# Patient Record
Sex: FEMALE | Race: WHITE | NOT HISPANIC OR LATINO | Employment: UNEMPLOYED | URBAN - METROPOLITAN AREA
[De-identification: names, ages, dates, MRNs, and addresses within clinical notes are randomized per-mention and may not be internally consistent; named-entity substitution may affect disease eponyms.]

---

## 2018-09-11 ENCOUNTER — OFFICE VISIT (OUTPATIENT)
Dept: NEUROSURGERY | Facility: CLINIC | Age: 28
End: 2018-09-11

## 2018-09-11 VITALS
HEIGHT: 69 IN | DIASTOLIC BLOOD PRESSURE: 68 MMHG | SYSTOLIC BLOOD PRESSURE: 102 MMHG | WEIGHT: 228.8 LBS | BODY MASS INDEX: 33.89 KG/M2 | TEMPERATURE: 97.8 F

## 2018-09-11 DIAGNOSIS — F32.A DEPRESSION, UNSPECIFIED DEPRESSION TYPE: Primary | ICD-10-CM

## 2018-09-11 DIAGNOSIS — M54.2 NECK PAIN: ICD-10-CM

## 2018-09-11 DIAGNOSIS — G89.29 CHRONIC BILATERAL THORACIC BACK PAIN: ICD-10-CM

## 2018-09-11 DIAGNOSIS — M51.34 DEGENERATIVE DISC DISEASE, THORACIC: ICD-10-CM

## 2018-09-11 DIAGNOSIS — M54.6 CHRONIC BILATERAL THORACIC BACK PAIN: ICD-10-CM

## 2018-09-11 PROCEDURE — 99203 OFFICE O/P NEW LOW 30 MIN: CPT | Performed by: NEUROLOGICAL SURGERY

## 2018-09-11 RX ORDER — HYDROXYZINE 50 MG/1
50 TABLET, FILM COATED ORAL 3 TIMES DAILY PRN
COMMUNITY

## 2018-09-11 RX ORDER — INSULIN GLARGINE 100 [IU]/ML
INJECTION, SOLUTION SUBCUTANEOUS DAILY
COMMUNITY

## 2018-09-11 RX ORDER — METHOCARBAMOL 750 MG/1
750 TABLET, FILM COATED ORAL NIGHTLY
Qty: 30 TABLET | Refills: 0 | Status: SHIPPED | OUTPATIENT
Start: 2018-09-11

## 2018-09-11 RX ORDER — NABUMETONE 750 MG/1
750 TABLET, FILM COATED ORAL 2 TIMES DAILY
Qty: 60 TABLET | Refills: 0 | Status: SHIPPED | OUTPATIENT
Start: 2018-09-11

## 2018-09-11 RX ORDER — DULOXETIN HYDROCHLORIDE 30 MG/1
30 CAPSULE, DELAYED RELEASE ORAL DAILY
COMMUNITY

## 2018-09-11 NOTE — PROGRESS NOTES
Josyln Zheng  1990  [id number]      Chief Complaint   Patient presents with   • Neck Pain   • Arm Pain     BUE       HISTORY OF PRESENT ILLNESS:  This is a 28-year-old female who presents with a chief complaint of pain in her cervical, thoracic and lumbar area; numbness in her upper and lower extremities; and lifting bending and twisting.  Her history dates back 15 years.  Both of her parents apparently are disabled and she was called upon to do all the household chores.  Through them had degenerative disc disease; her father had multiple surgeries on his spine.  They have relocated multiple times although have lived in [facility] for the past 10 years.  She has been diagnosed as having diabetes with an A1c of 10-11; glucose concentration greater than 300.  She is had cervical and thoracic MRI and is referred for neurosurgical consultation.  EMG and NCV of been repeated on 2 occasions.  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     Past Medical History:   Diagnosis Date   • Arthritis    • Diabetes mellitus (CMS/HCC)    • Headache    • Low back pain    • Pneumonia        Past Surgical History:   Procedure Laterality Date   • APPENDECTOMY     •  SECTION     • CHOLECYSTECTOMY         Family History   Problem Relation Age of Onset   • Fibromyalgia Mother    • Arthritis Father    • Other Father         chronic back problems       Social History     Social History   • Marital status:      Spouse name: N/A   • Number of children: N/A   • Years of education: N/A     Occupational History   • Not on file.     Social History Main Topics   • Smoking status: Current Every Day Smoker     Packs/day: 1.00   • Smokeless tobacco: Not on file   • Alcohol use Yes   • Drug use: No   • Sexual activity: Defer     Other Topics Concern   • Not on file     Social History Narrative   • No narrative on file       No  Known Allergies      Current Outpatient Prescriptions:   •  Dulaglutide (TRULICITY) 1.5 MG/0.5ML solution pen-injector, Inject  under the skin into the appropriate area as directed., Disp: , Rfl:   •  DULoxetine (CYMBALTA) 30 MG capsule, Take 30 mg by mouth Daily., Disp: , Rfl:   •  Empagliflozin (JARDIANCE) 25 MG tablet, Take  by mouth., Disp: , Rfl:   •  hydrOXYzine (ATARAX) 50 MG tablet, Take 50 mg by mouth 3 (Three) Times a Day As Needed for Itching., Disp: , Rfl:   •  insulin glargine (LANTUS) 100 UNIT/ML injection, Inject  under the skin into the appropriate area as directed Daily., Disp: , Rfl:   •  insulin lispro (humaLOG) 100 UNIT/ML injection, Inject  under the skin into the appropriate area as directed 3 (Three) Times a Day Before Meals., Disp: , Rfl:     Review of Systems   Constitutional: Positive for activity change, chills, diaphoresis and fatigue. Negative for appetite change, fever and unexpected weight change.   HENT: Positive for congestion, dental problem, rhinorrhea and sinus pain. Negative for drooling, ear discharge, ear pain, facial swelling, hearing loss, mouth sores, nosebleeds, postnasal drip, sinus pressure, sneezing, sore throat, tinnitus, trouble swallowing and voice change.    Eyes: Positive for photophobia. Negative for pain, discharge, redness, itching and visual disturbance.   Respiratory: Positive for cough and shortness of breath. Negative for apnea, choking, chest tightness, wheezing and stridor.    Cardiovascular: Positive for chest pain and palpitations. Negative for leg swelling.   Gastrointestinal: Positive for abdominal pain, diarrhea and rectal pain. Negative for abdominal distention, anal bleeding, blood in stool, constipation, nausea and vomiting.   Endocrine: Positive for polydipsia and polyuria. Negative for cold intolerance, heat intolerance and polyphagia.   Genitourinary: Positive for dyspareunia, flank pain, menstrual problem and pelvic pain. Negative for decreased  "urine volume, difficulty urinating, dysuria, enuresis, frequency, genital sores, hematuria and urgency.   Musculoskeletal: Positive for arthralgias, back pain, myalgias, neck pain and neck stiffness. Negative for gait problem and joint swelling.   Skin: Negative for color change, pallor, rash and wound.   Allergic/Immunologic: Positive for environmental allergies. Negative for food allergies and immunocompromised state.   Neurological: Positive for dizziness, weakness, light-headedness, numbness and headaches. Negative for tremors, seizures, syncope, facial asymmetry and speech difficulty.   Hematological: Negative for adenopathy. Does not bruise/bleed easily.   Psychiatric/Behavioral: Positive for confusion and decreased concentration. Negative for agitation, behavioral problems, dysphoric mood, hallucinations, self-injury, sleep disturbance and suicidal ideas. The patient is nervous/anxious. The patient is not hyperactive.        Vitals:    09/11/18 1030   BP: 102/68   BP Location: Right arm   Patient Position: Sitting   Cuff Size: Adult   Temp: 97.8 °F (36.6 °C)   TempSrc: Temporal Artery    Weight: 104 kg (228 lb 12.8 oz)   Height: 175.3 cm (69\")       Neurological Examination:    Mental status/speech: The patient is alert and oriented.  Speech is clear without aphysia or dysarthria.  No overt cognitive deficits.    Cranial nerve examination:    Olfaction: Smell is intact.  Vision: Vision is intact without visual field abnormalities.  Funduscopic examination is normal.  No pupillary irregularity.  Ocular motor examination: The extraocular muscles are intact.  There is no diplopia.  The pupil is round and reactive to both light and accommodation.  There is no nystagmus.  Facial movement/sensation: There is no facial weakness.  Sensation is intact in the first, second, and third divisions of the trigeminal nerve.  The corneal reflex is intact.  Auditory: Hearing is intact to finger rub bilaterally.  Cranial nerves " IX, X, XI, XII: Phonation is normal.  No dysphagia.  Tongue is protruded in the midline without atrophy.  The gag reflex is intact.  Shoulder shrug is normal.    Musculoligamentous ligamentous examination: She has slightly obese with limitation of range of motion of the cervical lumbar spine.  Her is no weakness, sensory loss or reflex asymmetry.  No Babinski Kalin or clonus.  Her gait is normal.    Medical Decision Making:     Diagnostic Data Set:  The cervical MRI shows straightening of the cervical canal with no high-grade spinal stenosis or abnormal signal within the spinal cord.  The thoracic MRI shows multilevel degenerative disc disease without compromise of the canal or abnormal signal within the spinal cord.  A lumbar MRI is not available for interpretation.      Assessment:  Symptomatic degenerative osteoarthritis.          Recommendations:  Medical history is very, very complex.  There are several issues that need to be addressed.  1.  I think she has marked depression and would strongly recommend psychological evaluation and treatment.  She saw a psychologist for 2 years.  This was subsequently abandoned for reasons which are obscure.  2.  She has poorly controlled diabetes.  That is in the process of being rectified by alterations in her medicines.  3.  She has genetically linked degenerative osteoarthritic changes for which surgery is not an option.  I have given her a prescription of Relafen 750 twice a day, Robaxin 750 night and referred her to physical therapy for education.  Unfortunately if she does not address the first 2 of these issues I do not believe that she will ever get well.  I can help or discuss this case do not hesitate to call me.        I greatly appreciate the opportunity to see and evaluate this individual.  If you have questions or concerns regarding issues that I may have overlooked please call me at any time: [phone].  Cale Prieto M.D.  [facility]  [id number]  [facility]  [id number]    Scribed for Dejuan Prieto MD by Rigoberto Watts CMA. 9/11/2018  10:50 AM    I have read and concur with the information provided by the scribe.  Dejuan Prieto MD

## 2020-10-01 ENCOUNTER — OFFICE VISIT (OUTPATIENT)
Dept: SURGERY | Facility: CLINIC | Age: 30
End: 2020-10-01

## 2020-10-01 VITALS
HEIGHT: 69 IN | DIASTOLIC BLOOD PRESSURE: 70 MMHG | HEART RATE: 90 BPM | RESPIRATION RATE: 17 BRPM | OXYGEN SATURATION: 99 % | WEIGHT: 208 LBS | BODY MASS INDEX: 30.81 KG/M2 | TEMPERATURE: 98.2 F | SYSTOLIC BLOOD PRESSURE: 111 MMHG

## 2020-10-01 DIAGNOSIS — M51.34 DEGENERATIVE DISC DISEASE, THORACIC: Primary | ICD-10-CM

## 2020-10-01 PROCEDURE — 99203 OFFICE O/P NEW LOW 30 MIN: CPT | Performed by: SURGERY

## 2020-10-01 RX ORDER — METFORMIN HYDROCHLORIDE 500 MG/1
1000 TABLET, EXTENDED RELEASE ORAL 2 TIMES DAILY
COMMUNITY
Start: 2020-09-16

## 2020-10-01 RX ORDER — ERTUGLIFLOZIN 15 MG/1
15 TABLET, FILM COATED ORAL EVERY MORNING
COMMUNITY
Start: 2020-07-15

## 2020-10-01 NOTE — PROGRESS NOTES
10/1/2020    Patient Information  Joslyn Zheng  [address]  [place] [id number]  1990  [phone] (home)       Chief Complaint   Patient presents with   • Consult     Discuss Surgery       HPI  Patient is a 30-year-old white female here for evaluation previous to her anterior approach for a L1 S5 fusion by Dr. Hugo Fish.  Significantly this patient has had C-sections through low transverse incisions.  She is also had a laparoscopic appendectomy.  She reports she saw 1 of the surgeons in [place] for evaluation of this and then has been sent here to me instead.    Past Medical History:   Diagnosis Date   • Arthritis    • Asthma    • DDD (degenerative disc disease), lumbar    • Diabetes mellitus (CMS/HCC)    • Headache    • Low back pain    • Neuropathy    • Pneumonia        Past Surgical History:   Procedure Laterality Date   • APPENDECTOMY     •  SECTION     • CHOLECYSTECTOMY     • DIAGNOSTIC LAPAROSCOPY     • THORACIC LAMINECTOMY     • TONSILLECTOMY     • TUBAL ABDOMINAL LIGATION     • WOUND DEBRIDEMENT      Left Labial Abscess Debridement       Family History   Problem Relation Age of Onset   • Fibromyalgia Mother    • Diabetes Mother    • Arthritis Father    • Other Father         chronic back problems   • Diabetes Sister    • Hypertension Maternal Grandmother    • Heart disease Maternal Grandmother    • Cancer Maternal Grandmother    • Diabetes Maternal Grandmother    • Heart disease Maternal Grandfather    • Diabetes Maternal Grandfather        Social History     Socioeconomic History   • Marital status:      Spouse name: Not on file   • Number of children: Not on file   • Years of education: Not on file   • Highest education level: Not on file   Tobacco Use   • Smoking status: Former Smoker     Packs/day: 1.00   • Smokeless tobacco: Never Used   Substance and Sexual Activity   • Alcohol use: Not Currently   • Drug use: No   • Sexual activity: Defer       Current  Medications  Current Outpatient Medications   Medication Sig Dispense Refill   • Dulaglutide (TRULICITY) 1.5 MG/0.5ML solution pen-injector Inject  under the skin into the appropriate area as directed.     • metFORMIN ER (GLUCOPHAGE-XR) 500 MG 24 hr tablet Take 1,000 mg by mouth 2 (Two) Times a Day.     • nabumetone (RELAFEN) 750 MG tablet Take 1 tablet by mouth 2 (Two) Times a Day. 60 tablet 0   • Steglatro 15 MG tablet Take 15 mg by mouth Every Morning.     • DULoxetine (CYMBALTA) 30 MG capsule Take 30 mg by mouth Daily.     • Empagliflozin (JARDIANCE) 25 MG tablet Take  by mouth.     • hydrOXYzine (ATARAX) 50 MG tablet Take 50 mg by mouth 3 (Three) Times a Day As Needed for Itching.     • insulin glargine (LANTUS) 100 UNIT/ML injection Inject  under the skin into the appropriate area as directed Daily.     • insulin lispro (humaLOG) 100 UNIT/ML injection Inject  under the skin into the appropriate area as directed 3 (Three) Times a Day Before Meals.     • methocarbamol (ROBAXIN) 750 MG tablet Take 1 tablet by mouth Every Night. 30 tablet 0     No current facility-administered medications for this visit.        Allergies  Patient has no known allergies.      Review of Systems    The Past medical history, family history, social history, medication list, allergies, and Review of Systems has been reviewed and confirmed.    General: weight loss 30lbs  Integumentary: negative  Eyes: eyesight problems, glasses  ENT: negative  Respiratory: negative  Gastrointestinal: diarrhea and abdominal pain  Cardiovascular: rapid heart rate  Neurological: numbness, headaches and dizziness  Psychiatric: anxiety, depression and insomnia  Hematologic/Lymphatic: negative  Genitourinary: negative  Musculoskeletal: painful joints, sore muscles, back pain and joint stiffness  Endocrine: negative  Breasts: negative        Vitals:    10/01/20 1059   BP: 111/70   Pulse: 90   Resp: 17   Temp: 98.2 °F (36.8 °C)   SpO2: 99%     Body mass index is  "30.72 kg/m².      10/01/20  1059   Weight: 94.3 kg (208 lb)     175.3 cm (69\")      Physical Exam  HENT:      Head: Normocephalic and atraumatic.      Right Ear: External ear normal.      Left Ear: External ear normal.      Nose: Nose normal.      Mouth/Throat:      Mouth: Mucous membranes are moist.   Eyes:      Extraocular Movements: Extraocular movements intact.      Conjunctiva/sclera: Conjunctivae normal.      Pupils: Pupils are equal, round, and reactive to light.   Neck:      Musculoskeletal: Neck supple.   Cardiovascular:      Rate and Rhythm: Normal rate and regular rhythm.   Pulmonary:      Effort: Pulmonary effort is normal.      Breath sounds: Normal breath sounds.   Abdominal:      Palpations: Abdomen is soft.      Comments: Suprapubic transverse scar from C-sections.   Musculoskeletal: Normal range of motion.   Skin:     General: Skin is warm and dry.   Neurological:      General: No focal deficit present.      Mental Status: She is alert and oriented to person, place, and time.   Psychiatric:         Mood and Affect: Mood normal.         Behavior: Behavior normal.             Assessment   Degenerative disc disease L5-S1    It is possible that the lower transverse incision she is had from her C-sections and the appendectomy which was done laparoscopically could prevent a successful retroperitoneal anterior approach to the spine.  I informed her that it is likely that we would not have any problems getting there but it is possible that because of the scars would interfere.        Plan  Anterior approach to L5-S1 for degenerative disc disease              This document signed by Jerome Starkey MD October 1, 2020 11:16 EDT       "